# Patient Record
Sex: MALE | Race: WHITE | HISPANIC OR LATINO | Employment: FULL TIME | URBAN - METROPOLITAN AREA
[De-identification: names, ages, dates, MRNs, and addresses within clinical notes are randomized per-mention and may not be internally consistent; named-entity substitution may affect disease eponyms.]

---

## 2017-02-02 ENCOUNTER — OFFICE VISIT (OUTPATIENT)
Dept: URGENT CARE | Facility: CLINIC | Age: 32
End: 2017-02-02
Payer: COMMERCIAL

## 2017-02-02 VITALS
OXYGEN SATURATION: 97 % | HEART RATE: 66 BPM | RESPIRATION RATE: 16 BRPM | TEMPERATURE: 97.3 F | BODY MASS INDEX: 23.73 KG/M2 | WEIGHT: 156 LBS | SYSTOLIC BLOOD PRESSURE: 118 MMHG | DIASTOLIC BLOOD PRESSURE: 80 MMHG

## 2017-02-02 DIAGNOSIS — S60.559A FOREIGN BODY HAND: ICD-10-CM

## 2017-02-02 PROCEDURE — 10120 INC&RMVL FB SUBQ TISS SMPL: CPT | Performed by: PHYSICIAN ASSISTANT

## 2017-02-02 ASSESSMENT — ENCOUNTER SYMPTOMS
VOMITING: 0
FEVER: 0
TINGLING: 0
FOCAL WEAKNESS: 0
NAUSEA: 0
SENSORY CHANGE: 0
CHILLS: 0

## 2017-02-02 NOTE — PROGRESS NOTES
Subjective:      Jason Schmitt is a 31 y.o. male who presents with Foreign Body            Foreign Body  Pertinent negatives include no fever or vomiting.   Yesterday while working w/ 2x4 saw splinter of wood go into left hand near base of index, c/o fob sensation, very mild today, minimal pain, more here due to having seen go in and portion broke off, PMH of similar w/ wood working. Would like removed if possible. Full motion/senation to hand.     Review of Systems   Constitutional: Negative for fever and chills.   Gastrointestinal: Negative for nausea and vomiting.   Musculoskeletal: Positive for joint pain ( POS for pain to left hand).   Neurological: Negative for tingling, sensory change and focal weakness.       PMH:  has a past medical history of Headache(784.0). He also has no past medical history of Chronic airway obstruction, not elsewhere classified (CMS-[facility]), Blood transfusion, without reported diagnosis, Anxiety, Diabetic neuropathy (CMS-HCC), Hyperlipidemia, Ulcer (CMS-HCC), GERD (gastroesophageal reflux disease), or Headache, classical migraine.  MEDS:   Current outpatient prescriptions:   •  naproxen (NAPROSYN) 500 MG Tab, Take 1 Tab by mouth 2 times a day, with meals., Disp: 60 Tab, Rfl: 1  •  baclofen (LIORESAL) 20 MG tablet, Take 1 Tab by mouth 3 times a day., Disp: 30 Tab, Rfl: 0  ALLERGIES: No Known Allergies  SURGHX:   Past Surgical History   Procedure Laterality Date   • Circumcision adult N/A 9/9/2016     Procedure: CIRCUMCISION ADULT;  Surgeon: Sandro Brady M.D.;  Location: SURGERY [facility];  Service:      SOCHX:  reports that he has never smoked. He has never used smokeless tobacco. He reports that he drinks about 4.2 oz of alcohol per week. He reports that he does not use illicit drugs.  FH: Family history was reviewed, no pertinent findings to report    I have worn a mask for the entire encounter with this patient.      Objective:     /80 mmHg  Pulse 66   Temp(Src) 36.3 °C (97.3 °F)  Resp 16  Wt 70.761 kg (156 lb)  SpO2 97%     Physical Exam   Constitutional: He is oriented to person, place, and time. He appears well-developed and well-nourished. No distress.   HENT:   Head: Normocephalic and atraumatic.   Right Ear: External ear normal.   Left Ear: External ear normal.   Nose: Nose normal.   Eyes: Conjunctivae are normal. Right eye exhibits no discharge. Left eye exhibits no discharge. No scleral icterus.   Neck: Neck supple.   Pulmonary/Chest: Effort normal. No respiratory distress.   Musculoskeletal: Normal range of motion.        Left hand: He exhibits normal range of motion. Normal sensation noted. Normal strength noted.        Hands:  Neurological: He is alert and oriented to person, place, and time. Coordination normal.   Skin: Skin is warm and dry. He is not diaphoretic. No pallor.   Psychiatric: He has a normal mood and affect.   Nursing note and vitals reviewed.      Procedure: Skin Foreign Body Removal   -Risks, benefits and alternatives discussed. Risks including bleeding, nerve damage, infection and poor cosmetic outcome  -Sterile technique throughout  -Local anesthesia using plain lidocaine  -Foreign body removed with 11 blade and forceps  -No active bleeding after removal with minimal blood loss during procedure  -Patient tolerated well         Assessment/Plan:     1. Foreign body hand  Wound care discussed, trend resolution  Return to clinic with lack of resolution or progression of symptoms.

## 2017-02-02 NOTE — MR AVS SNAPSHOT
Jason SaulAlysonKong   2017 9:00 AM   Office Visit   MRN: [id number]    Department:  [facility]   Dept Phone:  [phone]    Description:  Male : 1985   Provider:  Alan Valdivia PA-C           Reason for Visit     Foreign Body x yesterday, Wood stick on lt. hand.       Allergies as of 2017     No Known Allergies      You were diagnosed with     Foreign body hand   [[id number]]         Vital Signs     Blood Pressure Pulse Temperature Respirations Weight Oxygen Saturation    118/80 mmHg 66 36.3 °C (97.3 °F) 16 70.761 kg (156 lb) 97%    Smoking Status                   Never Smoker            Basic Information     Date Of Birth Sex Race Ethnicity Preferred Language    1985 Male  or   Origin (Macedonian,Vietnamese,Nicaraguan,Cameroonian, etc) English      Problem List              ICD-10-CM Priority Class Noted - Resolved    Problems related to high-risk sexual behavior Z72.51   2015 - Present    Phimosis N47.1   2016 - Present    Acute bilateral back pain M54.9   10/24/2016 - Present      Health Maintenance        Date Due Completion Dates    IMM INFLUENZA (1) 2016 ---    IMM DTaP/Tdap/Td Vaccine (2 - Td) 2024            Current Immunizations     Tdap Vaccine 2014      Below and/or attached are the medications your provider expects you to take. Review all of your home medications and newly ordered medications with your provider and/or pharmacist. Follow medication instructions as directed by your provider and/or pharmacist. Please keep your medication list with you and share with your provider. Update the information when medications are discontinued, doses are changed, or new medications (including over-the-counter products) are added; and carry medication information at all times in the event of emergency situations     Allergies:  No Known Allergies          Medications  Valid as of: 2017 -  9:37 AM    Generic Name  Brand Name Tablet Size Instructions for use    Baclofen (Tab) LIORESAL 20 MG Take 1 Tab by mouth 3 times a day.        Naproxen (Tab) NAPROSYN 500 MG Take 1 Tab by mouth 2 times a day, with meals.        .                 Medicines prescribed today were sent to:     [facility] [address] [place] [address] (), [place] - [address]    [address] () [place] [address]    Phone: [phone] Fax: [phone]    Open 24 Hours?: No      Medication refill instructions:       If your prescription bottle indicates you have medication refills left, it is not necessary to call your provider’s office. Please contact your pharmacy and they will refill your medication.    If your prescription bottle indicates you do not have any refills left, you may request refills at any time through one of the following ways: The online iSites system (except Urgent Care), by calling your provider’s office, or by asking your pharmacy to contact your provider’s office with a refill request. Medication refills are processed only during regular business hours and may not be available until the next business day. Your provider may request additional information or to have a follow-up visit with you prior to refilling your medication.   *Please Note: Medication refills are assigned a new Rx number when refilled electronically. Your pharmacy may indicate that no refills were authorized even though a new prescription for the same medication is available at the pharmacy. Please request the medicine by name with the pharmacy before contacting your provider for a refill.           iSites Access Code: Activation code not generated  Current iSites Status: Active

## 2017-07-28 ENCOUNTER — OFFICE VISIT (OUTPATIENT)
Dept: URGENT CARE | Facility: CLINIC | Age: 32
End: 2017-07-28
Payer: COMMERCIAL

## 2017-07-28 VITALS
TEMPERATURE: 97.5 F | BODY MASS INDEX: 23.04 KG/M2 | DIASTOLIC BLOOD PRESSURE: 70 MMHG | HEIGHT: 68 IN | SYSTOLIC BLOOD PRESSURE: 108 MMHG | OXYGEN SATURATION: 97 % | HEART RATE: 87 BPM | WEIGHT: 152 LBS

## 2017-07-28 DIAGNOSIS — S05.8X2A EYE ABRASION, LEFT, INITIAL ENCOUNTER: ICD-10-CM

## 2017-07-28 DIAGNOSIS — T15.92XA FOREIGN BODY IN EYE, LEFT, INITIAL ENCOUNTER: ICD-10-CM

## 2017-07-28 DIAGNOSIS — R21 RASH OF HANDS: ICD-10-CM

## 2017-07-28 PROCEDURE — 99214 OFFICE O/P EST MOD 30 MIN: CPT | Performed by: NURSE PRACTITIONER

## 2017-07-28 RX ORDER — POLYMYXIN B SULFATE AND TRIMETHOPRIM 1; 10000 MG/ML; [USP'U]/ML
1 SOLUTION OPHTHALMIC 4 TIMES DAILY
Qty: 1 BOTTLE | Refills: 0 | Status: SHIPPED | OUTPATIENT
Start: 2017-07-28 | End: 2021-06-03

## 2017-07-28 RX ORDER — HYDROCORTISONE CREAM 1% 10 MG/G
1 CREAM TOPICAL 2 TIMES DAILY PRN
Qty: 1 TUBE | Refills: 0 | Status: SHIPPED | OUTPATIENT
Start: 2017-07-28 | End: 2021-06-03

## 2017-07-28 ASSESSMENT — ENCOUNTER SYMPTOMS
PHOTOPHOBIA: 0
EYE DISCHARGE: 0
BLURRED VISION: 0
WEAKNESS: 0
DOUBLE VISION: 0
FEVER: 0
MYALGIAS: 0
CHILLS: 0
EYE PAIN: 0
TINGLING: 0
SORE THROAT: 0
BRUISES/BLEEDS EASILY: 0
SENSORY CHANGE: 0
HEADACHES: 0
EYE REDNESS: 1

## 2017-07-28 NOTE — MR AVS SNAPSHOT
"        Jason Schmitt   2017 4:45 PM   Office Visit   MRN: [id number]    Department:  [facility]   Dept Phone:  [phone]    Description:  Male : 1985   Provider:  PAULO Lakhani           Reason for Visit     Foreign Body in Eye X Yesterday, Piece of metal in Left eye     Hand Burn X 1 month, Skin on hands are flakey       Allergies as of 2017     No Known Allergies      You were diagnosed with     Foreign body in eye, left, initial encounter   [[id number]]       Eye abrasion, left, initial encounter   [[id number]]       Rash of hands   [[id number]]         Vital Signs     Blood Pressure Pulse Temperature Height Weight Body Mass Index    108/70 mmHg 87 36.4 °C (97.5 °F) 1.727 m (5' 7.99\") 68.947 kg (152 lb) 23.12 kg/m2    Oxygen Saturation Smoking Status                97% Never Smoker           Basic Information     Date Of Birth Sex Race Ethnicity Preferred Language    1985 Male  or   Origin (Bengali,Palauan,St Lucian,Shemar, etc) English      Problem List              ICD-10-CM Priority Class Noted - Resolved    Problems related to high-risk sexual behavior Z72.51   2015 - Present    Phimosis N47.1   2016 - Present    Acute bilateral back pain M54.9   10/24/2016 - Present      Health Maintenance        Date Due Completion Dates    IMM INFLUENZA (1) 2017 ---    IMM DTaP/Tdap/Td Vaccine (2 - Td) 2024            Current Immunizations     Tdap Vaccine 2014      Below and/or attached are the medications your provider expects you to take. Review all of your home medications and newly ordered medications with your provider and/or pharmacist. Follow medication instructions as directed by your provider and/or pharmacist. Please keep your medication list with you and share with your provider. Update the information when medications are discontinued, doses are changed, or new medications (including over-the-counter products) " are added; and carry medication information at all times in the event of emergency situations     Allergies:  No Known Allergies          Medications  Valid as of: July 28, 2017 -  5:35 PM    Generic Name Brand Name Tablet Size Instructions for use    Baclofen (Tab) LIORESAL 20 MG Take 1 Tab by mouth 3 times a day.        Hydrocortisone Acetate (Cream) Hydrocortisone Acetate 1 % 1 Application by Apply externally route 2 times a day as needed. To affected areas        Naproxen (Tab) NAPROSYN 500 MG Take 1 Tab by mouth 2 times a day, with meals.        Polymyxin B-Trimethoprim (Solution) POLYTRIM 11466-7.1 UNIT/ML-% Place 1 Drop in left eye 4 times a day.        .                 Medicines prescribed today were sent to:     [facility] [address] [place] [address] (), [place] - [id number] () [place] [address]    Phone: [phone] Fax: [phone]    Open 24 Hours?: No      Medication refill instructions:       If your prescription bottle indicates you have medication refills left, it is not necessary to call your provider’s office. Please contact your pharmacy and they will refill your medication.    If your prescription bottle indicates you do not have any refills left, you may request refills at any time through one of the following ways: The online NONO system (except Urgent Care), by calling your provider’s office, or by asking your pharmacy to contact your provider’s office with a refill request. Medication refills are processed only during regular business hours and may not be available until the next business day. Your provider may request additional information or to have a follow-up visit with you prior to refilling your medication.   *Please Note: Medication refills are assigned a new Rx number when refilled electronically. Your pharmacy may indicate that no refills were authorized even though a new prescription for the same medication is available at the pharmacy. Please request the  medicine by name with the pharmacy before contacting your provider for a refill.        Referral     A referral request has been sent to our patient care coordination department. Please allow 3-5 business days for us to process this request and contact you either by phone or mail. If you do not hear from us by the 5th business day, please call us at [phone].           [email or web address] Access Code: Activation code not generated  Current [email or web address] Status: Active

## 2017-07-29 NOTE — PROGRESS NOTES
Subjective:      Jason Schmitt is a 31 y.o. male who presents with Foreign Body in Eye and Hand Burn            HPI  Jason is a 31 year old male who is here for piece of metal in left eye. 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 Admits to rubbing eye and rinsing with water. Denies vision change, HA, eye pain. States can see object in eft eye. States was wearing work goggles at the time. Denies wearing contact lens.  C/o hand dryness with redness, denies itchiness, denies using detergents/soaps/bleach or any other caustic agent at home or while working at home. Will clean with bar soap, sometimes antibacterial soap. Will use lotions but no hydrocortisone cream or skin barrier. States skin if flaky and will peel skin off periodically.    PMH:  has a past medical history of Headache. He also has no past medical history of Chronic airway obstruction, not elsewhere classified, Blood transfusion, without reported diagnosis, Anxiety, Diabetic neuropathy (CMS-HCC), Hyperlipidemia, Ulcer (CMS-HCC), GERD (gastroesophageal reflux disease), or Headache, classical migraine.  MEDS:   Current outpatient prescriptions:   •  polymixin-trimethoprim (POLYTRIM) 92084-5.1 UNIT/ML-% Solution, Place 1 Drop in left eye 4 times a day., Disp: 1 Bottle, Rfl: 0  •  Hydrocortisone Acetate 1 % Cream, 1 Application by Apply externally route 2 times a day as needed. To affected areas, Disp: 1 Tube, Rfl: 0  •  naproxen (NAPROSYN) 500 MG Tab, Take 1 Tab by mouth 2 times a day, with meals., Disp: 60 Tab, Rfl: 1  •  baclofen (LIORESAL) 20 MG tablet, Take 1 Tab by mouth 3 times a day., Disp: 30 Tab, Rfl: 0  ALLERGIES: No Known Allergies  SURGHX:   Past Surgical History   Procedure Laterality Date   • Circumcision adult N/A 9/9/2016     Procedure: CIRCUMCISION ADULT;  Surgeon: Sandro Brady M.D.;  Location: SURGERY [facility];  Service:      SOCHX:  reports that he has never smoked. He has never  "used smokeless tobacco. He reports that he drinks about 4.2 oz of alcohol per week. He reports that he does not use illicit drugs.  FH: Family history was reviewed, no pertinent findings to report    Review of Systems   Constitutional: Negative for fever, chills and malaise/fatigue.   HENT: Negative for congestion, ear pain and sore throat.    Eyes: Positive for redness. Negative for blurred vision, double vision, photophobia, pain and discharge.   Musculoskeletal: Negative for myalgias.   Skin: Positive for rash. Negative for itching.   Neurological: Negative for tingling, sensory change, weakness and headaches.   Endo/Heme/Allergies: Negative for environmental allergies. Does not bruise/bleed easily.   All other systems reviewed and are negative.         Objective:     /70 mmHg  Pulse 87  Temp(Src) 36.4 °C (97.5 °F)  Ht 1.727 m (5' 7.99\")  Wt 68.947 kg (152 lb)  BMI 23.12 kg/m2  SpO2 97%     Physical Exam   Constitutional: He is oriented to person, place, and time. Vital signs are normal. He appears well-developed and well-nourished. He is active and cooperative.  Non-toxic appearance. He does not have a sickly appearance. He does not appear ill. No distress.   HENT:   Head: Normocephalic.   Eyes: EOM are normal. Pupils are equal, round, and reactive to light. Left eye exhibits no chemosis, no discharge, no exudate and no hordeolum. Foreign body present in the left eye. Left conjunctiva is injected. Left conjunctiva has no hemorrhage. No scleral icterus.       Procedure:   Eye stain. Applied one drop Proparacaine to left eye. Apllied Fluorescein stain to left eye. Uptake seen with lamp at inner corner of left eye with pinpoint stain uptake at 9 o'clock position as well as an abrasion adjacent to iris. Attempted to gently remove pinpoint metal object with saline saturated cotton tip, rinsed eye with copious amount of saline, unable to visualize fluorescein stained object with black light after this      "        Neck: Normal range of motion. Neck supple.   Cardiovascular: Normal rate.    Pulmonary/Chest: Effort normal.   Musculoskeletal: Normal range of motion. He exhibits no edema or tenderness.        Right hand: He exhibits normal range of motion, no tenderness, no bony tenderness, normal two-point discrimination, normal capillary refill, no deformity, no laceration and no swelling. Normal sensation noted. Normal strength noted.        Left hand: He exhibits normal range of motion, no tenderness, no bony tenderness, normal two-point discrimination, normal capillary refill, no deformity, no laceration and no swelling. Normal sensation noted. Normal strength noted.        Hands:  Neurological: He is alert and oriented to person, place, and time.   Skin: Skin is warm, dry and intact. Rash noted. No abrasion, no bruising, no burn, no ecchymosis, no laceration and no lesion noted. He is not diaphoretic. There is erythema.   Top layer of skin on both palms at base of thumb region have dry scaly skin with redness, no bruising, drainage, blisters or vesicles at site, no itching seen   Vitals reviewed.              Assessment/Plan:     1. Foreign body in eye, left, initial encounter    - REFERRAL TO OPTOMETRY    2. Eye abrasion, left, initial encounter    - REFERRAL TO OPTOMETRY  - polymixin-trimethoprim (POLYTRIM) 50400-2.1 UNIT/ML-% Solution; Place 1 Drop in left eye 4 times a day.  Dispense: 1 Bottle; Refill: 0    3. Rash of hands    - Hydrocortisone Acetate 1 % Cream; 1 Application by Apply externally route 2 times a day as needed. To affected areas  Dispense: 1 Tube; Refill: 0    May rinse eyes in between medication with plain saline solution prn for eye dryness or debris in eye  Avoid touching/rubbing eyes  Should wear eye goggles in dirty environments with lots of dust or debris  May clean eyes with mild dilute soap along eyelash line with eyes closed, rinse with plenty of water  Monitor for increase in redness or  swelling, vision change, eye pain- need re-evaluation immediately  Follow up with optometry in next 24 hrs if pain persists, patient given name of [facility] off [address] as option to follow up, if over weekend, need to go to ER, patient notified of this    May clean area hands with mild soap, do not scrub, wash with tepid water, pat dry  Apply hydrocortisone cream and Desitin to rash area prn until healed  May use NSAID for any pain/discomfort  Monitor for increase in rash size or areas affected, pain, itchiness, redness with blistering, fever- re-evaluate

## 2017-07-31 ENCOUNTER — HOSPITAL ENCOUNTER (EMERGENCY)
Facility: MEDICAL CENTER | Age: 32
End: 2017-07-31
Attending: EMERGENCY MEDICINE
Payer: COMMERCIAL

## 2017-07-31 VITALS
DIASTOLIC BLOOD PRESSURE: 62 MMHG | BODY MASS INDEX: 22 KG/M2 | TEMPERATURE: 98.7 F | HEIGHT: 70 IN | RESPIRATION RATE: 17 BRPM | HEART RATE: 1 BPM | SYSTOLIC BLOOD PRESSURE: 124 MMHG | WEIGHT: 153.66 LBS | OXYGEN SATURATION: 98 %

## 2017-07-31 DIAGNOSIS — S00.252A: Primary | ICD-10-CM

## 2017-07-31 PROCEDURE — 99283 EMERGENCY DEPT VISIT LOW MDM: CPT

## 2017-07-31 PROCEDURE — 65205 REMOVE FOREIGN BODY FROM EYE: CPT

## 2017-07-31 RX ORDER — TOBRAMYCIN 3 MG/ML
2 SOLUTION/ DROPS OPHTHALMIC EVERY 4 HOURS
Qty: 1 BOTTLE | Refills: 0 | Status: SHIPPED | OUTPATIENT
Start: 2017-07-31 | End: 2017-08-03

## 2017-07-31 RX ORDER — HYDROCODONE BITARTRATE AND ACETAMINOPHEN 5; 325 MG/1; MG/1
1-2 TABLET ORAL EVERY 4 HOURS PRN
Qty: 10 TAB | Refills: 0 | Status: SHIPPED | OUTPATIENT
Start: 2017-07-31 | End: 2021-06-03

## 2017-07-31 ASSESSMENT — PAIN SCALES - GENERAL: PAINLEVEL_OUTOF10: 4

## 2017-07-31 NOTE — ED NOTES
Ambulates to triage  Chief Complaint   Patient presents with   • Foreign Body in Eye     L eye, was using a  and a piece of metal hit his eye, this happened last week     Pt said he can see the piece of metel when he looks in the mirror, denies any vision changes.  Was seen at  Friday and they tried to flush it out, but it didn't work.

## 2017-07-31 NOTE — ED AVS SNAPSHOT
7/31/2017    Jason EscobaraElginKong  [address] [place] [address]    Dear Jason:    [facility] wants to ensure your discharge home is safe and you or your loved ones have had all of your questions answered regarding your care after you leave the hospital.    Below is a list of resources and contact information should you have any questions regarding your hospital stay, follow-up instructions, or active medical symptoms.    Questions or Concerns Regarding… Contact   Medical Questions Related to Your Discharge  (7 days a week, 8am-5pm) Contact a Nurse Care Coordinator   [phone]   Medical Questions Not Related to Your Discharge  (24 hours a day / 7 days a week)  Contact the Nurse Health Line   [phone]    Medications or Discharge Instructions Refer to your discharge packet   or contact your [facility] Primary Care Provider   [phone]   Follow-up Appointment(s) Schedule your appointment via [employer]   or contact Scheduling [phone]   Billing Review your statement via [employer]  or contact Billing [phone]   Medical Records Review your records via [employer]   or contact Medical Records [phone]     You may receive a telephone call within two days of discharge. This call is to make certain you understand your discharge instructions and have the opportunity to have any questions answered. You can also easily access your medical information, test results and upcoming appointments via the [employer] free online health management tool. You can learn more and sign up at [employer]. For assistance setting up your [employer] account, please call [phone].    Once again, we want to ensure your discharge home is safe and that you have a clear understanding of any next steps in your care. If you have any questions or concerns, please do not hesitate to contact us, we are here for you. Thank you for choosing [facility] for your healthcare needs.    Sincerely,    Your [facility] Healthcare Team

## 2017-07-31 NOTE — DISCHARGE INSTRUCTIONS
"Eye Foreign Body  A foreign body refers to any object on the surface of the eye or in the eyeball that should not be there. A foreign body may be a small speck of dirt or dust, a hair or eyelash, a splinter, or any other object.   SIGNS AND SYMPTOMS  Symptoms depend on what the foreign body is and where it is in the eye. The most common locations are:   · On the inner surface of the upper or lower eyelids or on the covering of the white part of the eye (conjunctiva). Symptoms in this location are:  ¨ Pain and irritation, especially when blinking.  ¨ The feeling that something is in the eye.  · On the surface of the clear covering on the front of the eye (cornea). Symptoms in this location include:  ¨ Pain and irritation.    ¨ Small \"rust rings\" around a metallic foreign body.  ¨ The feeling that something is in the eye.    · Inside the eyeball. Foreign bodies inside the eye may cause:    ¨ Great pain.    ¨ Immediate loss of vision.    ¨ Distortion of the pupil.  DIAGNOSIS   Foreign bodies are found during an exam by an eye specialist. Those on the eyelids, conjunctiva, or cornea are usually (but not always) easily found. When a foreign body is inside the eyeball, a cloudiness of the lens (cataract) may form almost right away. This makes it hard for an eye specialist to find the foreign body. Tests may be needed, including ultrasound testing, X-rays, and CT scans.  TREATMENT   · Foreign bodies on the eyelids, conjunctiva, or cornea are often removed easily and painlessly.  · Rust in the cornea may require the use of a drill-like instrument to remove the rust.   · If the foreign body has caused a scratch or a rubbing or scraping (abrasion) of the cornea, this may be treated with antibiotic drops or ointment. A pressure patch may be put over your eye.  · If the foreign body is inside your eyeball, surgery is needed right away. This is a medical emergency. Foreign bodies inside the eye threaten vision. A person may even " lose his or her eye.  HOME CARE INSTRUCTIONS   · Take medicines only as directed by your health care provider. Use eye drops or ointment as directed.  · If no eye patch was applied:   · Keep your eye closed as much as possible.  · Do not rub your eye.  · Wear dark glasses as needed to protect your eyes from bright light.  · Do not wear contact lenses until your eye feels normal again, or as instructed by your health care provider.  · Wear a protective eye covering if there is a risk of eye injury. This is important when working with high-speed tools.  · If your eye is patched:  · Follow your health care provider's instructions for when to remove the patch.  · Do not drive or operate machinery if your eye is patched. Your ability to  distances is impaired.  · Keep all follow-up visits as directed by your health care provider. This is important.  SEEK MEDICAL CARE IF:   · You have increased pain in your eye.  · Your vision gets worse.    · You have problems with your eye patch.    · You have fluid (discharge) coming from your injured eye.    · You have redness and swelling around your affected eye.    MAKE SURE YOU:   · Understand these instructions.  · Will watch your condition.  · Will get help right away if you are not doing well or get worse.     This information is not intended to replace advice given to you by your health care provider. Make sure you discuss any questions you have with your health care provider.     Document Released: 12/18/2006 Document Revised: 01/08/2016 Document Reviewed: 05/15/2014  [employer] Interactive Patient Education ©2016 [employer]    Eye Foreign Body  A foreign body is an object on or in the eye that should not be there. The object could be a speck of dirt or dust, a hair, an eyelash, a splinter, or any other object.  HOME CARE  · Take medicines only as told by your doctor. Use eye drops or ointment as told.  · If no eye patch was put on:  ¨ Keep the eye closed as much as  possible.  ¨ Do not rub the eye.  ¨ Wear dark glasses in bright light.  ¨ Do not wear contact lenses until the eye feels normal, or as told by your doctor.  ¨ Wear protective eye covering when needed, especially when using high-speed tools.  · If your eye is patched:  ¨ Follow your doctor's instructions for when to remove the patch.  ¨ Do not drive or use machines while the eye patch is on. Judging distances is hard to do while wearing a patch.  · Keep all follow-up visits as told by your doctor. This is important.  GET HELP IF:   · Your pain gets worse.  · Your vision gets worse.  · You have problems with your eye patch.  · You have fluid (discharge) coming from your eye.  · You have redness and swelling around your eye.  MAKE SURE YOU:   · Understand these instructions.  · Will watch your condition.  · Will get help right away if you are not doing well or get worse.     This information is not intended to replace advice given to you by your health care provider. Make sure you discuss any questions you have with your health care provider.     Document Released: 06/07/2011 Document Revised: 01/08/2016 Document Reviewed: 05/15/2014  [employer] Interactive Patient Education ©2016 [employer]

## 2017-07-31 NOTE — ED AVS SNAPSHOT
Home Care Instructions                                                                                                                Jason Schmitt   MRN: [id number]    Department:  [facility], Emergency Dept   Date of Visit:  7/31/2017            [facility], Emergency Dept    [address] [address]    Phone:  [phone]      You were seen by     Russel Burden M.D.      Your Diagnosis Was     FB (foreign body) of eyelid, left     H02.816 metal removed      Follow-up Information     1. Follow up with SAMUEL Resendiz In 2 days.    Specialty:  Family Medicine    Contact information    [address]  [address]  [facility] [address] [phone]          2. Follow up with Brenda Vyas M.D. In 1 week.    Specialty:  Ophthalmology    Contact information    [address] [facility] [id number] [phone]        Medication Information     Review all of your home medications and newly ordered medications with your primary doctor and/or pharmacist as soon as possible. Follow medication instructions as directed by your doctor and/or pharmacist.     Please keep your complete medication list with you and share with your physician. Update the information when medications are discontinued, doses are changed, or new medications (including over-the-counter products) are added; and carry medication information at all times in the event of emergency situations.               Medication List      START taking these medications        Instructions    Morning Afternoon Evening Bedtime    hydrocodone-acetaminophen 5-325 MG Tabs per tablet   Commonly known as:  NORCO        Take 1-2 Tabs by mouth every four hours as needed.   Dose:  1-2 Tab                        tobramycin 0.3 % Soln ophthalmic solution   Commonly known as:  TOBREX        2 Drops by Ophthalmic route every 4 hours for 3 days.   Dose:  2 Drop                          ASK your doctor about these  "medications        Instructions    Morning Afternoon Evening Bedtime    baclofen 20 MG tablet   Commonly known as:  LIORESAL        Take 1 Tab by mouth 3 times a day.   Dose:  20 mg                        Hydrocortisone Acetate 1 % Crea        1 Application by Apply externally route 2 times a day as needed. To affected areas   Dose:  1 Application                        naproxen 500 MG Tabs   Commonly known as:  NAPROSYN        Take 1 Tab by mouth 2 times a day, with meals.   Dose:  500 mg                        polymixin-trimethoprim 90860-4.1 UNIT/ML-% Soln   Commonly known as:  POLYTRIM        Place 1 Drop in left eye 4 times a day.   Dose:  1 Drop                             Where to Get Your Medications      You can get these medications from any pharmacy     Bring a paper prescription for each of these medications    - hydrocodone-acetaminophen 5-325 MG Tabs per tablet  - tobramycin 0.3 % Soln ophthalmic solution              Discharge Instructions       Eye Foreign Body  A foreign body refers to any object on the surface of the eye or in the eyeball that should not be there. A foreign body may be a small speck of dirt or dust, a hair or eyelash, a splinter, or any other object.   SIGNS AND SYMPTOMS  Symptoms depend on what the foreign body is and where it is in the eye. The most common locations are:   · On the inner surface of the upper or lower eyelids or on the covering of the white part of the eye (conjunctiva). Symptoms in this location are:  ¨ Pain and irritation, especially when blinking.  ¨ The feeling that something is in the eye.  · On the surface of the clear covering on the front of the eye (cornea). Symptoms in this location include:  ¨ Pain and irritation.    ¨ Small \"rust rings\" around a metallic foreign body.  ¨ The feeling that something is in the eye.    · Inside the eyeball. Foreign bodies inside the eye may cause:    ¨ Great pain.    ¨ Immediate loss of vision.    ¨ Distortion of the " pupil.  DIAGNOSIS   Foreign bodies are found during an exam by an eye specialist. Those on the eyelids, conjunctiva, or cornea are usually (but not always) easily found. When a foreign body is inside the eyeball, a cloudiness of the lens (cataract) may form almost right away. This makes it hard for an eye specialist to find the foreign body. Tests may be needed, including ultrasound testing, X-rays, and CT scans.  TREATMENT   · Foreign bodies on the eyelids, conjunctiva, or cornea are often removed easily and painlessly.  · Rust in the cornea may require the use of a drill-like instrument to remove the rust.   · If the foreign body has caused a scratch or a rubbing or scraping (abrasion) of the cornea, this may be treated with antibiotic drops or ointment. A pressure patch may be put over your eye.  · If the foreign body is inside your eyeball, surgery is needed right away. This is a medical emergency. Foreign bodies inside the eye threaten vision. A person may even lose his or her eye.  HOME CARE INSTRUCTIONS   · Take medicines only as directed by your health care provider. Use eye drops or ointment as directed.  · If no eye patch was applied:   · Keep your eye closed as much as possible.  · Do not rub your eye.  · Wear dark glasses as needed to protect your eyes from bright light.  · Do not wear contact lenses until your eye feels normal again, or as instructed by your health care provider.  · Wear a protective eye covering if there is a risk of eye injury. This is important when working with high-speed tools.  · If your eye is patched:  · Follow your health care provider's instructions for when to remove the patch.  · Do not drive or operate machinery if your eye is patched. Your ability to  distances is impaired.  · Keep all follow-up visits as directed by your health care provider. This is important.  SEEK MEDICAL CARE IF:   · You have increased pain in your eye.  · Your vision gets worse.    · You have  problems with your eye patch.    · You have fluid (discharge) coming from your injured eye.    · You have redness and swelling around your affected eye.    MAKE SURE YOU:   · Understand these instructions.  · Will watch your condition.  · Will get help right away if you are not doing well or get worse.     This information is not intended to replace advice given to you by your health care provider. Make sure you discuss any questions you have with your health care provider.     Document Released: 12/18/2006 Document Revised: 01/08/2016 Document Reviewed: 05/15/2014  [employer] Interactive Patient Education ©2016 [employer]    Eye Foreign Body  A foreign body is an object on or in the eye that should not be there. The object could be a speck of dirt or dust, a hair, an eyelash, a splinter, or any other object.  HOME CARE  · Take medicines only as told by your doctor. Use eye drops or ointment as told.  · If no eye patch was put on:  ¨ Keep the eye closed as much as possible.  ¨ Do not rub the eye.  ¨ Wear dark glasses in bright light.  ¨ Do not wear contact lenses until the eye feels normal, or as told by your doctor.  ¨ Wear protective eye covering when needed, especially when using high-speed tools.  · If your eye is patched:  ¨ Follow your doctor's instructions for when to remove the patch.  ¨ Do not drive or use machines while the eye patch is on. Judging distances is hard to do while wearing a patch.  · Keep all follow-up visits as told by your doctor. This is important.  GET HELP IF:   · Your pain gets worse.  · Your vision gets worse.  · You have problems with your eye patch.  · You have fluid (discharge) coming from your eye.  · You have redness and swelling around your eye.  MAKE SURE YOU:   · Understand these instructions.  · Will watch your condition.  · Will get help right away if you are not doing well or get worse.     This information is not intended to replace advice given to you by your health care  provider. Make sure you discuss any questions you have with your health care provider.     Document Released: 06/07/2011 Document Revised: 01/08/2016 Document Reviewed: 05/15/2014  [employer] Interactive Patient Education ©2016 [employer]            Patient Information     Patient Information    Following emergency treatment: all patient requiring follow-up care must return either to a private physician or a clinic if your condition worsens before you are able to obtain further medical attention, please return to the emergency room.     Billing Information    At [facility], we work to make the billing process streamlined for our patients.  Our Representatives are here to answer any questions you may have regarding your hospital bill.  If you have insurance coverage and have supplied your insurance information to us, we will submit a claim to your insurer on your behalf.  Should you have any questions regarding your bill, we can be reached online or by phone as follows:  Online: You are able pay your bills online or live chat with our representatives about any billing questions you may have. We are here to help Monday - Friday from 8:00am to 7:30pm and 9:00am - 12:00pm on Saturdays.  Please visit [email or web address]  for more information.   Phone:  [phone] or [phone]    Please note that your emergency physician, surgeon, pathologist, radiologist, anesthesiologist, and other specialists are not employed by [facility] and will therefore bill separately for their services.  Please contact them directly for any questions concerning their bills at the numbers below:     Emergency Physician Services:  [phone]  [facility]:  [phone]  [facility]:  [phone]  [facility]:  [phone]    1. Your final bill may vary from the amount quoted upon discharge if all procedures are not complete at that time, or if your doctor  has additional procedures of which we are not aware. You will receive an additional bill if you return to the Emergency Department at [facility] for suture removal regardless of the facility of which the sutures were placed.     2. Please arrange for settlement of this account at the emergency registration.    3. All self-pay accounts are due in full at the time of treatment.  If you are unable to meet this obligation then payment is expected within 4-5 days.     4. If you have had radiology studies (CT, X-ray, Ultrasound, MRI), you have received a preliminary result during your emergency department visit. Please contact the radiology department [phone] to receive a copy of your final result. Please discuss the Final result with your primary physician or with the follow up physician provided.     Crisis Hotline:  [place] Crisis Hotline:  1-725-IIGUCPE or 1-211.609.8169  [place] Crisis Hotline:    1-810.747.4703 or 374-618-7378         ED Discharge Follow Up Questions    1. In order to provide you with very good care, we would like to follow up with a phone call in the next few days.  May we have your permission to contact you?     YES /  NO    2. What is the best phone number to call you? (       )_____-__________    3. What is the best time to call you?      Morning  /  Afternoon  /  Evening                   Patient Signature:  ____________________________________________________________    Date:  ____________________________________________________________

## 2017-07-31 NOTE — ED AVS SNAPSHOT
Axis Three Access Code: Activation code not generated  Current Axis Three Status: Active    [employer]  A secure, online tool to manage your health information     [employer]’s Axis Three® is a secure, online tool that connects you to your personalized health information from the privacy of your home -- day or night - making it very easy for you to manage your healthcare. Once the activation process is completed, you can even access your medical information using the Axis Three jef, which is available for free in the Apple Jef store or Google Play store.     Axis Three provides the following levels of access (as shown below):   My Chart Features   [facility] Primary Care Doctor [facility]  Specialists [facility]  Urgent  Care Non-[facility]  Primary Care  Doctor   Email your healthcare team securely and privately 24/7 X X X X   Manage appointments: schedule your next appointment; view details of past/upcoming appointments X      Request prescription refills. X      View recent personal medical records, including lab and immunizations X X X X   View health record, including health history, allergies, medications X X X X   Read reports about your outpatient visits, procedures, consult and ER notes X X X X   See your discharge summary, which is a recap of your hospital and/or ER visit that includes your diagnosis, lab results, and care plan. X X       How to register for Axis Three:  1. Go to  [email or web address].  2. Click on the Sign Up Now box, which takes you to the New Member Sign Up page. You will need to provide the following information:  a. Enter your Axis Three Access Code exactly as it appears at the top of this page. (You will not need to use this code after you’ve completed the sign-up process. If you do not sign up before the expiration date, you must request a new code.)   b. Enter your date of birth.   c. Enter your home email address.   d. Click Submit, and follow the next screen’s instructions.  3. Create a Axis Three ID. This will  be your [employer] login ID and cannot be changed, so think of one that is secure and easy to remember.  4. Create a [employer] password. You can change your password at any time.  5. Enter your Password Reset Question and Answer. This can be used at a later time if you forget your password.   6. Enter your e-mail address. This allows you to receive e-mail notifications when new information is available in [employer].  7. Click Sign Up. You can now view your health information.    For assistance activating your [employer] account, call [phone]

## 2017-07-31 NOTE — ED PROVIDER NOTES
"ED Provider Note    Scribed for Russel Burden M.D. by Tran Wilde. 7/31/2017  4:17 PM    Primary Care Provider: SAMUEL Resendiz  Means of arrival: Walk-in  History limited by: None    CHIEF COMPLAINT  Chief Complaint   Patient presents with   • Foreign Body in Eye     L eye, was using a  and a piece of metal hit his eye, this happened last week       HPI  Jason Schmitt is a 31 y.o. male who presents to the ED complaining of foreign body in his left eye onset six days ago. Per patient, he was grinding metal at home six days ago when he felt a \"spark\" fly into his eye. Patient washed his eye out immediately after the incident and reports initially feeling improved. Patient then went to Urgent Care four days ago when his eye pain returned. He was given some numbing eye drops during this visit and his eye was flushed out. He reports feeling improved after this and currently has no eye pain. Patient reports when he opens and closes his eyes he can still feel something in his left eye. He denies any visual changes or disturbances. Patient has no further complaints at this time.     REVIEW OF SYSTEMS    CONSTITUTIONAL:  Denies fever, chills, weight gain/loss, or weakness.  EYES:  Foreign body in left eye which the patient states from using a  last Wednesday. Denies photophobia or discharge. She says his eye feels much better now but he is worried that he still is foreign body survey came in to the hospital.  ENT:  Denies sore throat, nose, or ear pain.  RESPIRATORY:  Denies cough, or difficulty breathing..  SKIN:  No rash or bruising.  NEUROLOGIC:  Denies headache, focal weakness, or numbness.    PAST MEDICAL HISTORY  Past Medical History   Diagnosis Date   • Headache      1x/month; self-resolving in a few hours       FAMILY HISTORY  Family History   Problem Relation Age of Onset   • Diabetes Maternal Grandmother    • Hyperlipidemia Father    • Cancer Maternal Grandfather      unk " "      SOCIAL HISTORY   reports that he has never smoked. He has never used smokeless tobacco. He reports that he drinks about 4.2 oz of alcohol per week. He reports that he does not use illicit drugs.    SURGICAL HISTORY  Past Surgical History   Procedure Laterality Date   • Circumcision adult N/A 9/9/2016     Procedure: CIRCUMCISION ADULT;  Surgeon: Sandro Brady M.D.;  Location: SURGERY [facility];  Service:        CURRENT MEDICATIONS  No current facility-administered medications on file prior to encounter.     Current Outpatient Prescriptions on File Prior to Encounter   Medication Sig Dispense Refill   • polymixin-trimethoprim (POLYTRIM) 88855-0.1 UNIT/ML-% Solution Place 1 Drop in left eye 4 times a day. 1 Bottle 0   • Hydrocortisone Acetate 1 % Cream 1 Application by Apply externally route 2 times a day as needed. To affected areas 1 Tube 0   • naproxen (NAPROSYN) 500 MG Tab Take 1 Tab by mouth 2 times a day, with meals. 60 Tab 1   • baclofen (LIORESAL) 20 MG tablet Take 1 Tab by mouth 3 times a day. 30 Tab 0     ALLERGIES  No Known Allergies    PHYSICAL EXAM  VITAL SIGNS: /64 mmHg  Pulse 74  Temp(Src) 37.1 °C (98.7 °F) (Temporal)  Resp 18  Ht 1.778 m (5' 10\")  Wt 69.7 kg (153 lb 10.6 oz)  BMI 22.05 kg/m2  SpO2 97%     Constitutional: Patient is awake and alert. No acute respiratory distress. Well developed, Well nourished, Non-toxic appearance.  HENT: Normocephalic, Atraumatic, Bilateral external ears normal, Oropharynx pink moist with no exudates, Nose patent.  Eyes: PERRLA, EOMI, Sclera and conjunctiva clear, No discharge. Obvious foreign body 10:00 left eye.  Cardiovascular: Heart is regular rate and rhythm no murmur,  Thorax & Lungs: Chest is symmetrical, with good breath sounds. No wheezing or crackles. No respiratory distress,     Slit lamp examination  Left eye. Anterior chamber is clear.  Proparacaine was placed into the left eye.  White light shows an obvious foreign body 10:00 " with a small piece of metal or grit.  Anterior chamber is clear.  Pupil again is equal round reactive. No oblong left.     Procedure:   Foreign body was removed using a sterile Q-tip under the slit lamp.    Complications none.        COURSE & MEDICAL DECISION MAKING  Pertinent Labs & Imaging studies reviewed. (See chart for details)    4:17 PM - Patient seen and examined at bedside. I applied numbing drops to patient's eye and evaluated it more closely. I will also examine patient's visual acuity at this time.      4:51 PM - Patient's eye exam revealed foreign body 10:00 left eye. He will be discharged home with prescription for NORCO and Tobrex ophthalmic solution to treat his symptoms. He was instructed to follow up with Dr. Vyas (ophthalmologist) in the next week for further evaluation. Patient understood and was in agreement with this treatment plan. He will return to the ED if his symptoms worsen before his follow up appointment.     Decision Making  Patient has a foreign body left eye piece of grit from the grinding or metal. This was removed and a sterile fashion using sterile Q-tip under the slit lamp examination. His period of time we will place him on antibiotic drops and pain medicines. He will follow-up with his own primary care eye physician or Dr. Vyas within 3 days. I explained to the size to be sore again because of the removal of foreign body for couple of days but should get better with time but follow-up is very important.    I reviewed prescription monitoring program for patient's narcotic use before prescribing a scheduled drug.The patient will not drink alcohol nor drive with prescribed medications. The patient will return for new or worsening symptoms and is stable at the time of discharge.    The patient is referred to a primary physician for blood pressure management, diabetic screening, and for all other preventative health concerns.    DISPOSITION:  Patient will be discharged home in  stable condition.    FOLLOW UP:  SAMUEL Resendiz  [address] [facility] [id number]  [place] [place] [id number] [phone]    In 2 days      Brenda Vyas M.D.  [address] [facility] [address]  [phone]    In 1 week        OUTPATIENT MEDICATIONS:  New Prescriptions    HYDROCODONE-ACETAMINOPHEN (NORCO) 5-325 MG TAB PER TABLET    Take 1-2 Tabs by mouth every four hours as needed.    TOBRAMYCIN (TOBREX) 0.3 % SOLUTION OPHTHALMIC SOLUTION    2 Drops by Ophthalmic route every 4 hours for 3 days.       FINAL IMPRESSION  Foreign body left eye removed by me    PLAN  1. Foreign body information sheet  2. Tobramycin ophthalmic drops/[place]. Pain  3. Follow up with his primary care ophthalmologist or ophthalmology doctor, or Dr. Vyas within 7 days  4. Rest his eyes for 3 days   5. Return to the emergency department for increased pains, fevers, vomiting or change in condition.     Tran EASTON (Scribe), am scribing for, and in the presence of, Russel Burden M.D..    Electronically signed by: Tran Wilde (Riaibe), 7/31/2017    Russel EASTON M.D. personally performed the services described in this documentation, as scribed by Tran Wilde in my presence, and it is both accurate and complete.    The note accurately reflects work and decisions made by me.  Russel Burden  7/31/2017  6:59 PM

## 2017-08-01 NOTE — ED NOTES
Pt verbalizes understanding of discharge instructions. Patient to follow up with opthamology and PCP for further treatment. Patient ambulatory to discharge with steady gait. NAD or deficits noted at time of discharge

## 2018-01-10 ENCOUNTER — OFFICE VISIT (OUTPATIENT)
Dept: MEDICAL GROUP | Facility: CLINIC | Age: 33
End: 2018-01-10
Payer: COMMERCIAL

## 2018-01-10 VITALS
HEART RATE: 81 BPM | OXYGEN SATURATION: 98 % | DIASTOLIC BLOOD PRESSURE: 70 MMHG | BODY MASS INDEX: 22.88 KG/M2 | RESPIRATION RATE: 14 BRPM | WEIGHT: 151 LBS | HEIGHT: 68 IN | SYSTOLIC BLOOD PRESSURE: 120 MMHG | TEMPERATURE: 98.1 F

## 2018-01-10 DIAGNOSIS — L30.9 ECZEMA, UNSPECIFIED TYPE: ICD-10-CM

## 2018-01-10 PROCEDURE — 99213 OFFICE O/P EST LOW 20 MIN: CPT | Performed by: FAMILY MEDICINE

## 2018-01-10 RX ORDER — TRIAMCINOLONE ACETONIDE 1 MG/G
CREAM TOPICAL
Qty: 1 TUBE | Refills: 0 | Status: SHIPPED | OUTPATIENT
Start: 2018-01-10 | End: 2021-06-03

## 2018-01-10 ASSESSMENT — PATIENT HEALTH QUESTIONNAIRE - PHQ9: CLINICAL INTERPRETATION OF PHQ2 SCORE: 0

## 2018-01-10 NOTE — PROGRESS NOTES
"CC: Rash    HPI:   Jason is a 32 year old  male who presents today with the following.    Rash in the past 6 days. This has been mainly on his face and a little bit on his forearms. He says it is not itchy or painful but feels dry. He denies any unusual exposures. No change in detergents. He and his family use Dove soap. Denies history of allergies. Denies fever, chills, head or chest congestion. Denies change in bowel or bladder habits. He says otherwise he feels well      Patient Active Problem List    Diagnosis Date Noted   • Acute bilateral back pain 10/24/2016   • Phimosis 09/09/2016   • Problems related to high-risk sexual behavior 07/17/2015       Current Outpatient Prescriptions   Medication Sig Dispense Refill   • triamcinolone acetonide (KENALOG) 0.1 % Cream Apply as thin film to affected areas twice daily. 1 Tube 0   • hydrocodone-acetaminophen (NORCO) 5-325 MG Tab per tablet Take 1-2 Tabs by mouth every four hours as needed. (Patient not taking: Reported on 1/10/2018) 10 Tab 0   • polymixin-trimethoprim (POLYTRIM) 30854-1.1 UNIT/ML-% Solution Place 1 Drop in left eye 4 times a day. (Patient not taking: Reported on 1/10/2018) 1 Bottle 0   • Hydrocortisone Acetate 1 % Cream 1 Application by Apply externally route 2 times a day as needed. To affected areas (Patient not taking: Reported on 1/10/2018) 1 Tube 0   • naproxen (NAPROSYN) 500 MG Tab Take 1 Tab by mouth 2 times a day, with meals. (Patient not taking: Reported on 1/10/2018) 60 Tab 1   • baclofen (LIORESAL) 20 MG tablet Take 1 Tab by mouth 3 times a day. 30 Tab 0     No current facility-administered medications for this visit.          Allergies as of 01/10/2018   • (No Known Allergies)        ROS: As per HPI.    /70   Pulse 81   Temp 36.7 °C (98.1 °F)   Resp 14   Ht 1.727 m (5' 8\")   Wt 68.5 kg (151 lb)   SpO2 98%   BMI 22.96 kg/m²     Physical Exam:  Gen:         Alert and oriented, No apparent distress.  Neck:        No " Lymphadenopathy   Skin:         Faint papular rash on face and forearms. It is more easily palpable than visible.            Ext:          No clubbing, cyanosis, edema.      Assessment and Plan.   32 y.o. male with the following issues.    1. Eczema, unspecified type    - triamcinolone acetonide (KENALOG) 0.1 % Cream; Apply as thin film to affected areas twice daily.  Dispense: 1 Tube; Refill: 0. He is cautioned to use very little in his face away from his eyes  -Anti-eczema measures reviewed.    Follow-up when necessary.

## 2019-01-25 ENCOUNTER — OFFICE VISIT (OUTPATIENT)
Dept: URGENT CARE | Facility: CLINIC | Age: 34
End: 2019-01-25
Payer: COMMERCIAL

## 2019-01-25 VITALS
HEIGHT: 68 IN | SYSTOLIC BLOOD PRESSURE: 122 MMHG | BODY MASS INDEX: 23.34 KG/M2 | OXYGEN SATURATION: 99 % | DIASTOLIC BLOOD PRESSURE: 76 MMHG | HEART RATE: 76 BPM | WEIGHT: 154 LBS | RESPIRATION RATE: 16 BRPM | TEMPERATURE: 99 F

## 2019-01-25 DIAGNOSIS — R05.9 COUGH: ICD-10-CM

## 2019-01-25 DIAGNOSIS — J01.40 ACUTE PANSINUSITIS, RECURRENCE NOT SPECIFIED: ICD-10-CM

## 2019-01-25 PROCEDURE — 99214 OFFICE O/P EST MOD 30 MIN: CPT | Performed by: PHYSICIAN ASSISTANT

## 2019-01-25 RX ORDER — FLUTICASONE PROPIONATE 50 MCG
1 SPRAY, SUSPENSION (ML) NASAL DAILY
Qty: 16 G | Refills: 0 | Status: SHIPPED | OUTPATIENT
Start: 2019-01-25 | End: 2021-06-03

## 2019-01-25 RX ORDER — DOXYCYCLINE HYCLATE 100 MG
100 TABLET ORAL 2 TIMES DAILY
Qty: 10 TAB | Refills: 0 | Status: SHIPPED | OUTPATIENT
Start: 2019-01-25 | End: 2019-01-30

## 2019-01-25 ASSESSMENT — ENCOUNTER SYMPTOMS
SHORTNESS OF BREATH: 0
EYE DISCHARGE: 0
VOMITING: 0
HEADACHES: 1
COUGH: 1
DIZZINESS: 0
SORE THROAT: 1
WHEEZING: 0
FEVER: 0
DIARRHEA: 0
TINGLING: 0
CHILLS: 0
EYE REDNESS: 0
SPUTUM PRODUCTION: 1
MYALGIAS: 0
NECK PAIN: 0

## 2019-01-25 NOTE — PROGRESS NOTES
Subjective:      Jason Schmitt is a 33 y.o. male who presents with Cough (cough x 3 weeks on and off)            Patient is a pleasant 33-year-old male who presents to urgent care with cough, congestion, drainage and sore throat off and on for the last 3-4 weeks.  Patient reports he thought he was feeling better a few weeks ago however then symptoms returned.  He admits of the last few days he has been having worsening his nasal drainage, sore throat and congestion.  He does report a notable cough with slight sputum production.  He denies any fevers, chills or shortness of breath.  Patient has been utilizing over-the-counter cold formulations with minimal improvement of symptoms.  Patient denies prior history of lung problems, CHF, asthma or bronchitis.      Sinusitis   This is a new problem. Episode onset: 2-3 weeks ago. There has been no fever. His pain is at a severity of 4/10. The pain is moderate. Associated symptoms include congestion, coughing, headaches and a sore throat. Pertinent negatives include no chills, ear pain, neck pain or shortness of breath. Treatments tried: Cold formulations. The treatment provided mild relief.       Review of Systems   Constitutional: Positive for malaise/fatigue. Negative for chills and fever.   HENT: Positive for congestion and sore throat. Negative for ear discharge and ear pain.    Eyes: Negative for discharge and redness.   Respiratory: Positive for cough and sputum production. Negative for shortness of breath and wheezing.    Cardiovascular: Negative for chest pain.   Gastrointestinal: Negative for diarrhea and vomiting.   Genitourinary: Negative for dysuria.   Musculoskeletal: Negative for myalgias and neck pain.   Skin: Negative for itching and rash.   Neurological: Positive for headaches. Negative for dizziness and tingling.   All other systems reviewed and are negative.         Objective:     /76 (BP Location: Right arm, Patient Position: Sitting, BP  "Cuff Size: Adult)   Pulse 76   Temp 37.2 °C (99 °F) (Temporal)   Resp 16   Ht 1.727 m (5' 7.99\")   Wt 69.9 kg (154 lb)   SpO2 99%   BMI 23.42 kg/m²    PMH:  has a past medical history of Headache(784.0). He also has no past medical history of Anxiety; Blood transfusion, without reported diagnosis; Chronic airway obstruction, not elsewhere classified; Diabetic neuropathy (HCC); GERD (gastroesophageal reflux disease); Headache, classical migraine; Hyperlipidemia; or Ulcer.  MEDS:   Current Outpatient Prescriptions:   •  fluticasone (FLONASE) 50 MCG/ACT nasal spray, Spray 1 Spray in nose every day., Disp: 16 g, Rfl: 0  •  doxycycline (VIBRAMYCIN) 100 MG Tab, Take 1 Tab by mouth 2 times a day for 5 days., Disp: 10 Tab, Rfl: 0  •  triamcinolone acetonide (KENALOG) 0.1 % Cream, Apply as thin film to affected areas twice daily., Disp: 1 Tube, Rfl: 0  •  hydrocodone-acetaminophen (NORCO) 5-325 MG Tab per tablet, Take 1-2 Tabs by mouth every four hours as needed. (Patient not taking: Reported on 1/10/2018), Disp: 10 Tab, Rfl: 0  •  polymixin-trimethoprim (POLYTRIM) 15574-9.1 UNIT/ML-% Solution, Place 1 Drop in left eye 4 times a day. (Patient not taking: Reported on 1/10/2018), Disp: 1 Bottle, Rfl: 0  •  Hydrocortisone Acetate 1 % Cream, 1 Application by Apply externally route 2 times a day as needed. To affected areas (Patient not taking: Reported on 1/10/2018), Disp: 1 Tube, Rfl: 0  •  naproxen (NAPROSYN) 500 MG Tab, Take 1 Tab by mouth 2 times a day, with meals. (Patient not taking: Reported on 1/10/2018), Disp: 60 Tab, Rfl: 1  •  baclofen (LIORESAL) 20 MG tablet, Take 1 Tab by mouth 3 times a day. (Patient not taking: Reported on 1/25/2019), Disp: 30 Tab, Rfl: 0  ALLERGIES: No Known Allergies  SURGHX:   Past Surgical History:   Procedure Laterality Date   • CIRCUMCISION ADULT N/A 9/9/2016    Procedure: CIRCUMCISION ADULT;  Surgeon: Sandro Brady M.D.;  Location: SURGERY [facility];  Service:      SOCX:  " reports that he has never smoked. He has never used smokeless tobacco. He reports that he drinks about 4.2 oz of alcohol per week . He reports that he does not use drugs.  FH: Family history was reviewed, no pertinent findings to report    Physical Exam   Constitutional: He is oriented to person, place, and time. He appears well-developed and well-nourished.   HENT:   Head: Normocephalic and atraumatic.   Mouth/Throat: No oropharyngeal exudate.   Ears- Canals clear- TM- with clear fluid effusions bilaterally.   Pos. PND, with slight erythema- without tonsillar edema or exudate.   Mild discharge noted bilaterally- to nares.      Eyes: Pupils are equal, round, and reactive to light. EOM are normal.   Neck: Normal range of motion. Neck supple.   Cardiovascular: Normal rate and regular rhythm.    No murmur heard.  Pulmonary/Chest: Effort normal and breath sounds normal. No respiratory distress. He has no wheezes.   Musculoskeletal: Normal range of motion. He exhibits no edema or tenderness.   Lymphadenopathy:     He has no cervical adenopathy.   Neurological: He is alert and oriented to person, place, and time.   Skin: Skin is warm. No rash noted.   Psychiatric: He has a normal mood and affect. His behavior is normal.   Vitals reviewed.              Assessment/Plan:     1. Acute pansinusitis, recurrence not specified  - fluticasone (FLONASE) 50 MCG/ACT nasal spray; Spray 1 Spray in nose every day.  Dispense: 16 g; Refill: 0  - doxycycline (VIBRAMYCIN) 100 MG Tab; Take 1 Tab by mouth 2 times a day for 5 days.  Dispense: 10 Tab; Refill: 0    2. Cough    Due to duration of symptoms, sinus tenderness, and failure of OTC therapies- ABX was written to tx. For bacterial etiology for sinusitis. Discussed side effects of the medication.   Continue OTC supportive therapies- Flonase, OTC allergy meds, avoid night time dairy. Increase fluids. Humidification.   Patient given precautionary s/sx that mandate immediate follow up and  evaluation in the ED. Advised of risks of not doing so.    DDX, Supportive care, and indications for immediate follow-up discussed with patient.    Instructed to return to clinic or nearest emergency department if we are not available for any change in condition, further concerns, or worsening of symptoms.    The patient demonstrated a good understanding and agreed with the treatment plan

## 2019-11-18 ENCOUNTER — HOSPITAL ENCOUNTER (OUTPATIENT)
Dept: LAB | Facility: MEDICAL CENTER | Age: 34
End: 2019-11-18
Attending: OBSTETRICS & GYNECOLOGY
Payer: COMMERCIAL

## 2019-11-18 LAB
HBV SURFACE AG SER QL: NEGATIVE
HCV AB SER QL: NEGATIVE
HIV 1+2 AB+HIV1 P24 AG SERPL QL IA: NON REACTIVE
TREPONEMA PALLIDUM IGG+IGM AB [PRESENCE] IN SERUM OR PLASMA BY IMMUNOASSAY: NON REACTIVE

## 2019-11-18 PROCEDURE — 86706 HEP B SURFACE ANTIBODY: CPT

## 2019-11-18 PROCEDURE — 87491 CHLMYD TRACH DNA AMP PROBE: CPT

## 2019-11-18 PROCEDURE — 36415 COLL VENOUS BLD VENIPUNCTURE: CPT

## 2019-11-18 PROCEDURE — 86780 TREPONEMA PALLIDUM: CPT

## 2019-11-18 PROCEDURE — 87340 HEPATITIS B SURFACE AG IA: CPT

## 2019-11-18 PROCEDURE — 86803 HEPATITIS C AB TEST: CPT

## 2019-11-18 PROCEDURE — 87591 N.GONORRHOEAE DNA AMP PROB: CPT

## 2019-11-18 PROCEDURE — 87389 HIV-1 AG W/HIV-1&-2 AB AG IA: CPT

## 2019-11-19 LAB
C TRACH DNA SPEC QL NAA+PROBE: NEGATIVE
HBV SURFACE AB SERPL IA-ACNC: 6.8 MIU/ML (ref 0–10)
N GONORRHOEA DNA SPEC QL NAA+PROBE: NEGATIVE
SPECIMEN SOURCE: NORMAL

## 2020-02-01 ENCOUNTER — OFFICE VISIT (OUTPATIENT)
Dept: URGENT CARE | Facility: CLINIC | Age: 35
End: 2020-02-01
Payer: COMMERCIAL

## 2020-02-01 VITALS
DIASTOLIC BLOOD PRESSURE: 68 MMHG | RESPIRATION RATE: 12 BRPM | WEIGHT: 161.6 LBS | HEART RATE: 80 BPM | SYSTOLIC BLOOD PRESSURE: 110 MMHG | BODY MASS INDEX: 23.93 KG/M2 | OXYGEN SATURATION: 95 % | HEIGHT: 69 IN | TEMPERATURE: 98.3 F

## 2020-02-01 DIAGNOSIS — J02.0 PHARYNGITIS DUE TO STREPTOCOCCUS SPECIES: ICD-10-CM

## 2020-02-01 LAB
INT CON NEG: NEGATIVE
INT CON POS: POSITIVE
S PYO AG THROAT QL: POSITIVE

## 2020-02-01 PROCEDURE — 87880 STREP A ASSAY W/OPTIC: CPT | Performed by: PHYSICIAN ASSISTANT

## 2020-02-01 PROCEDURE — 99214 OFFICE O/P EST MOD 30 MIN: CPT | Performed by: PHYSICIAN ASSISTANT

## 2020-02-01 RX ORDER — AMOXICILLIN 500 MG/1
500 CAPSULE ORAL 2 TIMES DAILY
Qty: 20 CAP | Refills: 0 | Status: SHIPPED | OUTPATIENT
Start: 2020-02-01 | End: 2020-02-11

## 2020-02-01 RX ORDER — ACETAMINOPHEN 500 MG
500-1000 TABLET ORAL EVERY 6 HOURS PRN
COMMUNITY
End: 2021-06-03

## 2020-02-01 ASSESSMENT — ENCOUNTER SYMPTOMS
DIZZINESS: 0
DIARRHEA: 0
EYE DISCHARGE: 0
SHORTNESS OF BREATH: 0
CONSTIPATION: 0
VOMITING: 0
MYALGIAS: 0
ABDOMINAL PAIN: 0
TROUBLE SWALLOWING: 1
FEVER: 0
SORE THROAT: 1
COUGH: 1
NAUSEA: 0
WHEEZING: 0
PALPITATIONS: 0
SPUTUM PRODUCTION: 0
SWOLLEN GLANDS: 1
EYE PAIN: 0
HEADACHES: 1
CHILLS: 0
EYE REDNESS: 0

## 2020-02-02 NOTE — PROGRESS NOTES
Subjective:      Jason Triana is a 34 y.o. male who presents with Sore Throat (x 4 days.  Pt. complains of cough, sore throat and pt. said he felt like he had a fever. )      Pharyngitis    This is a new problem. The current episode started in the past 7 days (4 days). The problem has been gradually worsening. Neither side of throat is experiencing more pain than the other. Maximum temperature: Subjective fever. The fever has been present for 1 to 2 days. The pain is moderate. Associated symptoms include congestion (Mild), coughing, headaches, a plugged ear sensation, swollen glands and trouble swallowing. Pertinent negatives include no abdominal pain, diarrhea, ear discharge, ear pain, shortness of breath or vomiting. He has had no exposure to strep or mono. Treatments tried: Tylenol cold/flu, throat lozenges. The treatment provided no relief.       Review of Systems   Constitutional: Positive for malaise/fatigue. Negative for chills and fever (Subjective fever).   HENT: Positive for congestion (Mild), sore throat and trouble swallowing. Negative for ear discharge and ear pain.    Eyes: Negative for pain, discharge and redness.   Respiratory: Positive for cough. Negative for sputum production, shortness of breath and wheezing.    Cardiovascular: Negative for chest pain and palpitations.   Gastrointestinal: Negative for abdominal pain, constipation, diarrhea, nausea and vomiting.   Musculoskeletal: Negative for myalgias.   Skin: Negative for rash.   Neurological: Positive for headaches. Negative for dizziness.       PMH:  has a past medical history of Headache(784.0). He also has no past medical history of Anxiety, Blood transfusion, without reported diagnosis, Chronic airway obstruction, not elsewhere classified, Diabetic neuropathy (HCC), GERD (gastroesophageal reflux disease), Headache, classical migraine, Hyperlipidemia, or Ulcer.  MEDS:   Current Outpatient Medications:   •  acetaminophen (TYLENOL) 500  "MG Tab, Take 500-1,000 mg by mouth every 6 hours as needed., Disp: , Rfl:   •  amoxicillin (AMOXIL) 500 MG Cap, Take 1 Cap by mouth 2 times a day for 10 days., Disp: 20 Cap, Rfl: 0  •  fluticasone (FLONASE) 50 MCG/ACT nasal spray, Spray 1 Spray in nose every day., Disp: 16 g, Rfl: 0  •  triamcinolone acetonide (KENALOG) 0.1 % Cream, Apply as thin film to affected areas twice daily., Disp: 1 Tube, Rfl: 0  •  hydrocodone-acetaminophen (NORCO) 5-325 MG Tab per tablet, Take 1-2 Tabs by mouth every four hours as needed. (Patient not taking: Reported on 1/10/2018), Disp: 10 Tab, Rfl: 0  •  polymixin-trimethoprim (POLYTRIM) 19476-1.1 UNIT/ML-% Solution, Place 1 Drop in left eye 4 times a day. (Patient not taking: Reported on 1/10/2018), Disp: 1 Bottle, Rfl: 0  •  Hydrocortisone Acetate 1 % Cream, 1 Application by Apply externally route 2 times a day as needed. To affected areas (Patient not taking: Reported on 1/10/2018), Disp: 1 Tube, Rfl: 0  •  naproxen (NAPROSYN) 500 MG Tab, Take 1 Tab by mouth 2 times a day, with meals. (Patient not taking: Reported on 1/10/2018), Disp: 60 Tab, Rfl: 1  •  baclofen (LIORESAL) 20 MG tablet, Take 1 Tab by mouth 3 times a day. (Patient not taking: Reported on 1/25/2019), Disp: 30 Tab, Rfl: 0  ALLERGIES: No Known Allergies  SURGHX:   Past Surgical History:   Procedure Laterality Date   • CIRCUMCISION ADULT N/A 9/9/2016    Procedure: CIRCUMCISION ADULT;  Surgeon: Sandro Brady M.D.;  Location: SURGERY [facility];  Service:      SOCHX:  reports that he has never smoked. He has never used smokeless tobacco. He reports current alcohol use of about 4.2 oz of alcohol per week. He reports that he does not use drugs.  FH: Family history was reviewed, no pertinent findings to report     Objective:     /68   Pulse 80   Temp 36.8 °C (98.3 °F) (Temporal)   Resp 12   Ht 1.753 m (5' 9\")   Wt 73.3 kg (161 lb 9.6 oz)   SpO2 95%   BMI 23.86 kg/m²      Physical Exam  Constitutional:     "   Appearance: He is well-developed.   HENT:      Head: Normocephalic and atraumatic.      Right Ear: Tympanic membrane, ear canal and external ear normal.      Left Ear: Tympanic membrane, ear canal and external ear normal.      Nose: Nose normal.      Mouth/Throat:      Lips: Pink.      Mouth: Mucous membranes are moist.      Pharynx: Posterior oropharyngeal erythema present.      Tonsils: Tonsillar exudate present. Swellin+ on the right. 1+ on the left.   Eyes:      Conjunctiva/sclera: Conjunctivae normal.      Pupils: Pupils are equal, round, and reactive to light.   Neck:      Musculoskeletal: Normal range of motion.   Cardiovascular:      Rate and Rhythm: Normal rate and regular rhythm.      Heart sounds: Normal heart sounds. No murmur.   Pulmonary:      Effort: Pulmonary effort is normal.      Breath sounds: Normal breath sounds. No wheezing.   Lymphadenopathy:      Cervical: Cervical adenopathy present.   Skin:     General: Skin is warm and dry.      Capillary Refill: Capillary refill takes less than 2 seconds.   Neurological:      Mental Status: He is alert and oriented to person, place, and time.   Psychiatric:         Behavior: Behavior normal.         Judgment: Judgment normal.           POCT Rapid Strep A - POSITIVE     Assessment/Plan:       1. Pharyngitis due to Streptococcus species  - POCT Rapid Strep A  - amoxicillin (AMOXIL) 500 MG Cap; Take 1 Cap by mouth 2 times a day for 10 days.  Dispense: 20 Cap; Refill: 0  - PO fluids  - Rest  - Tylenol or ibuprofen as needed for fever > 100.4 F            Differential Diagnosis, natural history, and supportive care discussed. Return to the Urgent Care or follow up with your PCP if symptoms fail to resolve, or for any new or worsening symptoms. Emergency room precautions discussed. Patient and/or family appears understanding of information.

## 2021-06-03 ENCOUNTER — OFFICE VISIT (OUTPATIENT)
Dept: URGENT CARE | Facility: CLINIC | Age: 36
End: 2021-06-03
Payer: COMMERCIAL

## 2021-06-03 ENCOUNTER — HOSPITAL ENCOUNTER (OUTPATIENT)
Facility: MEDICAL CENTER | Age: 36
End: 2021-06-03
Attending: NURSE PRACTITIONER
Payer: COMMERCIAL

## 2021-06-03 VITALS
SYSTOLIC BLOOD PRESSURE: 110 MMHG | TEMPERATURE: 97.8 F | RESPIRATION RATE: 16 BRPM | HEART RATE: 88 BPM | OXYGEN SATURATION: 97 % | WEIGHT: 162.6 LBS | DIASTOLIC BLOOD PRESSURE: 80 MMHG | BODY MASS INDEX: 24.08 KG/M2 | HEIGHT: 69 IN

## 2021-06-03 DIAGNOSIS — R19.7 DIARRHEA, UNSPECIFIED TYPE: ICD-10-CM

## 2021-06-03 PROCEDURE — 87045 FECES CULTURE AEROBIC BACT: CPT

## 2021-06-03 PROCEDURE — 87899 AGENT NOS ASSAY W/OPTIC: CPT

## 2021-06-03 PROCEDURE — 87493 C DIFF AMPLIFIED PROBE: CPT

## 2021-06-03 PROCEDURE — 99213 OFFICE O/P EST LOW 20 MIN: CPT | Performed by: NURSE PRACTITIONER

## 2021-06-03 RX ORDER — DIPHENOXYLATE HYDROCHLORIDE AND ATROPINE SULFATE 2.5; .025 MG/1; MG/1
1 TABLET ORAL 4 TIMES DAILY PRN
Qty: 20 TABLET | Refills: 0 | Status: SHIPPED | OUTPATIENT
Start: 2021-06-03 | End: 2021-06-08

## 2021-06-03 ASSESSMENT — ENCOUNTER SYMPTOMS
HEADACHES: 0
MYALGIAS: 0
DIZZINESS: 0
NAUSEA: 0
CHILLS: 0
FEVER: 0
BLOOD IN STOOL: 0
VOMITING: 0
ABDOMINAL PAIN: 1
DIARRHEA: 1

## 2021-06-04 DIAGNOSIS — R19.7 DIARRHEA, UNSPECIFIED TYPE: ICD-10-CM

## 2021-06-04 LAB
C DIFF DNA SPEC QL NAA+PROBE: NEGATIVE
C DIFF TOX GENS STL QL NAA+PROBE: NEGATIVE

## 2021-06-05 LAB
E COLI SXT1+2 STL IA: NORMAL
SIGNIFICANT IND 70042: NORMAL
SITE SITE: NORMAL
SOURCE SOURCE: NORMAL

## 2021-06-06 LAB
BACTERIA STL CULT: NORMAL
C JEJUNI+C COLI AG STL QL: NORMAL
E COLI SXT1+2 STL IA: NORMAL
SIGNIFICANT IND 70042: NORMAL
SITE SITE: NORMAL
SOURCE SOURCE: NORMAL

## 2024-05-08 NOTE — PROGRESS NOTES
Subjective:      Jason Triana is a 35 y.o. male who presents with Diarrhea (x 1 wk, diarrhea)            HPI   New problem.  35-year-old male who presents with a 1 week history of diarrhea.  He reports watery stool up to 5 times per day.  He has associated abdominal cramping with this.  He denies fever, chills, nausea, or body aches.  He has not had any travel outside the country or consumption of suspected food.  He denies any recent hospitalization or recent antibiotic use in the last 4 to 6 weeks.  He denies any blood or pus in his stool.  His diarrhea does not wake him up at night.  He has not taken any medications for this.  He denies having any sick contacts at home.  Patient has no known allergies.  No current outpatient medications on file prior to visit.     No current facility-administered medications on file prior to visit.     Social History     Socioeconomic History   • Marital status:      Spouse name: Not on file   • Number of children: Not on file   • Years of education: Not on file   • Highest education level: Not on file   Occupational History   • Not on file   Tobacco Use   • Smoking status: Never Smoker   • Smokeless tobacco: Never Used   Vaping Use   • Vaping Use: Never used   Substance and Sexual Activity   • Alcohol use: Yes     Alcohol/week: 4.2 oz     Types: 4 Cans of beer, 3 Standard drinks or equivalent per week     Comment: 2-3 per week   • Drug use: No   • Sexual activity: Yes     Partners: Female     Comment: multiple; high risk behavior   Other Topics Concern   • Not on file   Social History Narrative   • Not on file     Social Determinants of Health     Financial Resource Strain:    • Difficulty of Paying Living Expenses:    Food Insecurity:    • Worried About Running Out of Food in the Last Year:    • Ran Out of Food in the Last Year:    Transportation Needs:    • Lack of Transportation (Medical):    • Lack of Transportation (Non-Medical):    Physical Activity:    •  "Days of Exercise per Week:    • Minutes of Exercise per Session:    Stress:    • Feeling of Stress :    Social Connections:    • Frequency of Communication with Friends and Family:    • Frequency of Social Gatherings with Friends and Family:    • Attends Mosque Services:    • Active Member of Clubs or Organizations:    • Attends Club or Organization Meetings:    • Marital Status:    Intimate Partner Violence:    • Fear of Current or Ex-Partner:    • Emotionally Abused:    • Physically Abused:    • Sexually Abused:      Breast Cancer-related family history is not on file.      Review of Systems   Constitutional: Negative for chills, fever and malaise/fatigue.   Gastrointestinal: Positive for abdominal pain and diarrhea. Negative for blood in stool, melena, nausea and vomiting.   Genitourinary: Negative.    Musculoskeletal: Negative for myalgias.   Neurological: Negative for dizziness and headaches.          Objective:     /80 (BP Location: Left arm, Patient Position: Sitting, BP Cuff Size: Adult)   Pulse 88   Temp 36.6 °C (97.8 °F) (Temporal)   Resp 16   Ht 1.753 m (5' 9\")   Wt 73.8 kg (162 lb 9.6 oz)   SpO2 97%   BMI 24.01 kg/m²      Physical Exam  Vitals and nursing note reviewed.   Constitutional:       General: He is not in acute distress.     Appearance: He is well-developed.   Cardiovascular:      Rate and Rhythm: Normal rate and regular rhythm.      Heart sounds: Normal heart sounds. No murmur heard.     Pulmonary:      Effort: Pulmonary effort is normal. No respiratory distress.      Breath sounds: Normal breath sounds.   Abdominal:      General: Bowel sounds are normal.      Palpations: Abdomen is soft.      Tenderness: There is abdominal tenderness in the epigastric area. There is no guarding.   Musculoskeletal:         General: Normal range of motion.      Comments: Moves all 4 extremities normally   Skin:     General: Skin is warm and dry.   Neurological:      Mental Status: He is alert " and oriented to person, place, and time.   Psychiatric:         Behavior: Behavior normal.         Thought Content: Thought content normal.                        Assessment/Plan:        1. Diarrhea, unspecified type  CULTURE STOOL    C Diff by PCR rflx Toxin    diphenoxylate-atropine (LOMOTIL) 2.5-0.025 MG Tab     Will call with results of stool studies. Advised restriction of dairy products and also to add daily probiotic.  Lomotil.  Differential diagnosis, natural history, supportive care, and indications for immediate follow-up discussed at length.      Statement Selected

## 2024-09-09 ENCOUNTER — HOSPITAL ENCOUNTER (OUTPATIENT)
Dept: LAB | Facility: MEDICAL CENTER | Age: 39
End: 2024-09-09
Attending: STUDENT IN AN ORGANIZED HEALTH CARE EDUCATION/TRAINING PROGRAM
Payer: COMMERCIAL

## 2024-09-09 LAB
BASOPHILS # BLD AUTO: 0.2 % (ref 0–1.8)
BASOPHILS # BLD: 0.01 K/UL (ref 0–0.12)
CHOLEST SERPL-MCNC: 231 MG/DL (ref 100–199)
EOSINOPHIL # BLD AUTO: 0.02 K/UL (ref 0–0.51)
EOSINOPHIL NFR BLD: 0.4 % (ref 0–6.9)
ERYTHROCYTE [DISTWIDTH] IN BLOOD BY AUTOMATED COUNT: 41.7 FL (ref 35.9–50)
HCT VFR BLD AUTO: 51.6 % (ref 42–52)
HDLC SERPL-MCNC: 71 MG/DL
HGB BLD-MCNC: 17.7 G/DL (ref 14–18)
IMM GRANULOCYTES # BLD AUTO: 0.01 K/UL (ref 0–0.11)
IMM GRANULOCYTES NFR BLD AUTO: 0.2 % (ref 0–0.9)
LDLC SERPL CALC-MCNC: 144 MG/DL
LYMPHOCYTES # BLD AUTO: 1.37 K/UL (ref 1–4.8)
LYMPHOCYTES NFR BLD: 28.6 % (ref 22–41)
MCH RBC QN AUTO: 30.3 PG (ref 27–33)
MCHC RBC AUTO-ENTMCNC: 34.3 G/DL (ref 32.3–36.5)
MCV RBC AUTO: 88.4 FL (ref 81.4–97.8)
MONOCYTES # BLD AUTO: 0.37 K/UL (ref 0–0.85)
MONOCYTES NFR BLD AUTO: 7.7 % (ref 0–13.4)
NEUTROPHILS # BLD AUTO: 3.01 K/UL (ref 1.82–7.42)
NEUTROPHILS NFR BLD: 62.9 % (ref 44–72)
NRBC # BLD AUTO: 0 K/UL
NRBC BLD-RTO: 0 /100 WBC (ref 0–0.2)
PLATELET # BLD AUTO: 201 K/UL (ref 164–446)
PMV BLD AUTO: 12.5 FL (ref 9–12.9)
RBC # BLD AUTO: 5.84 M/UL (ref 4.7–6.1)
TRIGL SERPL-MCNC: 82 MG/DL (ref 0–149)
TSH SERPL DL<=0.005 MIU/L-ACNC: 2.06 UIU/ML (ref 0.38–5.33)
WBC # BLD AUTO: 4.8 K/UL (ref 4.8–10.8)

## 2024-09-09 PROCEDURE — 36415 COLL VENOUS BLD VENIPUNCTURE: CPT

## 2024-09-09 PROCEDURE — 80061 LIPID PANEL: CPT

## 2024-09-09 PROCEDURE — 84443 ASSAY THYROID STIM HORMONE: CPT

## 2024-09-09 PROCEDURE — 85025 COMPLETE CBC W/AUTO DIFF WBC: CPT

## 2024-10-19 ENCOUNTER — OFFICE VISIT (OUTPATIENT)
Dept: URGENT CARE | Facility: CLINIC | Age: 39
End: 2024-10-19
Payer: COMMERCIAL

## 2024-10-19 VITALS
TEMPERATURE: 98.4 F | HEART RATE: 101 BPM | OXYGEN SATURATION: 99 % | HEIGHT: 70 IN | SYSTOLIC BLOOD PRESSURE: 112 MMHG | DIASTOLIC BLOOD PRESSURE: 70 MMHG | RESPIRATION RATE: 18 BRPM | WEIGHT: 155.1 LBS | BODY MASS INDEX: 22.2 KG/M2

## 2024-10-19 DIAGNOSIS — A08.4 VIRAL GASTROENTERITIS: ICD-10-CM

## 2024-10-19 PROCEDURE — 3078F DIAST BP <80 MM HG: CPT | Performed by: PHYSICIAN ASSISTANT

## 2024-10-19 PROCEDURE — 3074F SYST BP LT 130 MM HG: CPT | Performed by: PHYSICIAN ASSISTANT

## 2024-10-19 PROCEDURE — 99203 OFFICE O/P NEW LOW 30 MIN: CPT | Performed by: PHYSICIAN ASSISTANT

## 2024-10-19 RX ORDER — ONDANSETRON 4 MG/1
4 TABLET, ORALLY DISINTEGRATING ORAL EVERY 6 HOURS PRN
Qty: 15 TABLET | Refills: 0 | Status: SHIPPED | OUTPATIENT
Start: 2024-10-19

## 2024-10-19 ASSESSMENT — ENCOUNTER SYMPTOMS
BLOATING: 1
NAUSEA: 1
DIARRHEA: 1
NEUROLOGICAL NEGATIVE: 1
MYALGIAS: 0
FLATUS: 0
HEADACHES: 0
VOMITING: 1
BLOOD IN STOOL: 0
SWEATS: 0
CHILLS: 1
ABDOMINAL PAIN: 0
CARDIOVASCULAR NEGATIVE: 1
ARTHRALGIAS: 0
CONSTIPATION: 0
FEVER: 0
RESPIRATORY NEGATIVE: 1

## 2025-07-27 ENCOUNTER — OFFICE VISIT (OUTPATIENT)
Dept: URGENT CARE | Facility: CLINIC | Age: 40
End: 2025-07-27
Payer: COMMERCIAL

## 2025-07-27 VITALS
HEART RATE: 67 BPM | OXYGEN SATURATION: 98 % | TEMPERATURE: 98.2 F | SYSTOLIC BLOOD PRESSURE: 102 MMHG | WEIGHT: 158.5 LBS | HEIGHT: 70 IN | RESPIRATION RATE: 16 BRPM | DIASTOLIC BLOOD PRESSURE: 66 MMHG | BODY MASS INDEX: 22.69 KG/M2

## 2025-07-27 DIAGNOSIS — A09 INFECTIOUS DIARRHEA: Primary | ICD-10-CM

## 2025-07-27 DIAGNOSIS — A08.4 VIRAL ENTERITIS: ICD-10-CM

## 2025-07-27 NOTE — PATIENT INSTRUCTIONS
Thank you for trusting [facility] for your medical care today. You were seen by Jono Burgos MD. We hope that we were able to answer all of your questions, alleviate concerns, and create a plan going forward. If you need anything from us, don't hesitate to reach out.     If you develop any new or worsening symptoms that you believe need urgent or emergent evaluation, be sure to be reevaluated in urgent care or the emergency room.     Jono Burgos MD  Urgent Care

## 2025-07-27 NOTE — PROGRESS NOTES
"Urgent Care Visit Note  [facility]    07/27/25    Jason Triana     [address] [place] [address]     PCP: Tere Clayton (Inactive)     Subjective:     Chief Complaint   Patient presents with    Diarrhea     Since Thursday, no blood, fever yesterday, no cough        HPI:  Jason Triana is a 39 y.o. male who presents for 3 days of diarrhea and intermittent stomach cramping.  Cannot identify any precipitating factors.  Did not eat any questionable foods.  Did not drink any questionable water.  No recent travel.  Diarrhea is watery and brown without any signs of blood.  Endorses some mild and intermittent abdominal cramping but no abdominal pain currently or otherwise.  Felt a little warm yesterday but did not have any documented fever or chills.  No nausea or vomiting.  Appetite remains good.  Energy level remains good.  Of note, patient's significant other has the same symptoms currently.    ROS:  ROS     CURRENT MEDICATIONS:  Encounter Medications with Dispense Information[1]    ALLERGIES:   Allergies[2]    PROBLEM LIST:    does not have any pertinent problems on file.    Allergies, Medications, & Tobacco/Substance Use were reconciled by the Medical Assistant and reviewed by myself.     Objective:     /66 (BP Location: Left arm, Patient Position: Sitting, BP Cuff Size: Adult long)   Pulse 67   Temp 36.8 °C (98.2 °F) (Temporal)   Resp 16   Ht 1.778 m (5' 10\")   Wt 71.9 kg (158 lb 8 oz)   SpO2 98%   BMI 22.74 kg/m²     Physical Exam  Constitutional:       Appearance: Normal appearance.   HENT:      Head: Normocephalic and atraumatic.      Right Ear: External ear normal.      Left Ear: External ear normal.      Nose: Nose normal.      Mouth/Throat:      Mouth: Mucous membranes are moist.   Eyes:      Extraocular Movements: Extraocular movements intact.      Pupils: Pupils are equal, round, and reactive to light.   Cardiovascular:      Rate and Rhythm: Normal rate.      " Pulses: Normal pulses.   Pulmonary:      Effort: Pulmonary effort is normal.   Abdominal:      General: Abdomen is flat.      Tenderness: There is no abdominal tenderness. There is no right CVA tenderness or left CVA tenderness.   Skin:     General: Skin is warm.      Capillary Refill: Capillary refill takes less than 2 seconds.   Neurological:      Mental Status: He is alert and oriented to person, place, and time.      Gait: Gait normal.   Psychiatric:         Mood and Affect: Mood normal.         Behavior: Behavior normal.           Lab Results/POC Test Results         Assessment/Plan:     Patient's history and physical exam consistent with:    Assessment & Plan  Infectious diarrhea         Viral enteritis         Patient presents with likely viral enteritis/infectious diarrhea from a virus.  Has some mild abdominal cramping but no abdominal pain.  No hematochezia.  No additional concerning signs or symptoms. Low suspicion for bacterial infection and, even if enteritis was from a bacterial source, antibiotics would not be indicated in this case. Patient is well-appearing and well-hydrated appearing here today.  Educated on supportive care including use of over-the-counter medication such as loperamide as needed.  Given follow-up instructions and return/ER precautions regarding any new or worsening symptoms.    Discussed differential diagnosis, management options, risks/benefits, and alternatives to planned treatment. Pt expressed understanding and the treatment plan was agreed upon. Questions were encouraged and answered. Pt encouraged to return to urgent care as needed if new or worsening symptoms or if there is no improvement in condition. Pt educated in red flags and indications to immediately call 911 or present to the Emergency Department. Advised the patient to follow-up with the primary care physician for recheck, reevaluation, and further management.    I personally reviewed prior external notes and test  results pertinent to today's visit. I have independently reviewed and interpreted all diagnostics ordered during this visit.    Please note that this dictation was created using voice recognition software. I have made a reasonable attempt to correct obvious errors, but I expect that there are errors of grammar and possibly content that I did not discover before finalizing the note.    This note was electronically signed by Jono Burgos MD               [1]   Current Outpatient Medications   Medication Sig Refill Last Dispense    ondansetron (ZOFRAN ODT) 4 MG TABLET DISPERSIBLE Take 1 Tablet by mouth every 6 hours as needed for Nausea/Vomiting. (Patient not taking: Reported on 7/27/2025) 0 Unknown (outside pharmacy)   [2] No Known Allergies

## 2025-08-25 ENCOUNTER — HOSPITAL ENCOUNTER (OUTPATIENT)
Dept: LAB | Facility: MEDICAL CENTER | Age: 40
End: 2025-08-25
Attending: STUDENT IN AN ORGANIZED HEALTH CARE EDUCATION/TRAINING PROGRAM
Payer: COMMERCIAL

## 2025-08-25 LAB
ALBUMIN SERPL BCP-MCNC: 4.1 G/DL (ref 3.2–4.9)
ALBUMIN/GLOB SERPL: 1.4 G/DL
ALP SERPL-CCNC: 100 U/L (ref 30–99)
ALT SERPL-CCNC: 18 U/L (ref 2–50)
ANION GAP SERPL CALC-SCNC: 9 MMOL/L (ref 7–16)
AST SERPL-CCNC: 19 U/L (ref 12–45)
BASOPHILS # BLD AUTO: 0.2 % (ref 0–1.8)
BASOPHILS # BLD: 0.01 K/UL (ref 0–0.12)
BILIRUB SERPL-MCNC: 0.5 MG/DL (ref 0.1–1.5)
BUN SERPL-MCNC: 14 MG/DL (ref 8–22)
CALCIUM ALBUM COR SERPL-MCNC: 9.1 MG/DL (ref 8.5–10.5)
CALCIUM SERPL-MCNC: 9.2 MG/DL (ref 8.5–10.5)
CHLORIDE SERPL-SCNC: 103 MMOL/L (ref 96–112)
CHOLEST SERPL-MCNC: 210 MG/DL (ref 100–199)
CO2 SERPL-SCNC: 25 MMOL/L (ref 20–33)
CREAT SERPL-MCNC: 0.75 MG/DL (ref 0.5–1.4)
EOSINOPHIL # BLD AUTO: 0.06 K/UL (ref 0–0.51)
EOSINOPHIL NFR BLD: 1.3 % (ref 0–6.9)
ERYTHROCYTE [DISTWIDTH] IN BLOOD BY AUTOMATED COUNT: 39.8 FL (ref 35.9–50)
EST. AVERAGE GLUCOSE BLD GHB EST-MCNC: 108 MG/DL
GFR SERPLBLD CREATININE-BSD FMLA CKD-EPI: 116 ML/MIN/1.73 M 2
GLOBULIN SER CALC-MCNC: 3 G/DL (ref 1.9–3.5)
GLUCOSE SERPL-MCNC: 81 MG/DL (ref 65–99)
HBA1C MFR BLD: 5.4 % (ref 4–5.6)
HCT VFR BLD AUTO: 48.9 % (ref 42–52)
HDLC SERPL-MCNC: 60 MG/DL
HGB BLD-MCNC: 16.9 G/DL (ref 14–18)
IMM GRANULOCYTES # BLD AUTO: 0.01 K/UL (ref 0–0.11)
IMM GRANULOCYTES NFR BLD AUTO: 0.2 % (ref 0–0.9)
LDLC SERPL CALC-MCNC: 132 MG/DL
LYMPHOCYTES # BLD AUTO: 1.59 K/UL (ref 1–4.8)
LYMPHOCYTES NFR BLD: 34.5 % (ref 22–41)
MCH RBC QN AUTO: 29.8 PG (ref 27–33)
MCHC RBC AUTO-ENTMCNC: 34.6 G/DL (ref 32.3–36.5)
MCV RBC AUTO: 86.1 FL (ref 81.4–97.8)
MONOCYTES # BLD AUTO: 0.43 K/UL (ref 0–0.85)
MONOCYTES NFR BLD AUTO: 9.3 % (ref 0–13.4)
NEUTROPHILS # BLD AUTO: 2.51 K/UL (ref 1.82–7.42)
NEUTROPHILS NFR BLD: 54.5 % (ref 44–72)
NRBC # BLD AUTO: 0 K/UL
NRBC BLD-RTO: 0 /100 WBC (ref 0–0.2)
PLATELET # BLD AUTO: 207 K/UL (ref 164–446)
PMV BLD AUTO: 12.7 FL (ref 9–12.9)
POTASSIUM SERPL-SCNC: 4.6 MMOL/L (ref 3.6–5.5)
PROT SERPL-MCNC: 7.1 G/DL (ref 6–8.2)
RBC # BLD AUTO: 5.68 M/UL (ref 4.7–6.1)
SODIUM SERPL-SCNC: 137 MMOL/L (ref 135–145)
TRIGL SERPL-MCNC: 92 MG/DL (ref 0–149)
TSH SERPL DL<=0.005 MIU/L-ACNC: 1.98 UIU/ML (ref 0.38–5.33)
WBC # BLD AUTO: 4.6 K/UL (ref 4.8–10.8)

## 2025-08-25 PROCEDURE — 85025 COMPLETE CBC W/AUTO DIFF WBC: CPT

## 2025-08-25 PROCEDURE — 80061 LIPID PANEL: CPT

## 2025-08-25 PROCEDURE — 80053 COMPREHEN METABOLIC PANEL: CPT

## 2025-08-25 PROCEDURE — 84443 ASSAY THYROID STIM HORMONE: CPT

## 2025-08-25 PROCEDURE — 83036 HEMOGLOBIN GLYCOSYLATED A1C: CPT

## 2025-08-25 PROCEDURE — 36415 COLL VENOUS BLD VENIPUNCTURE: CPT
